# Patient Record
Sex: MALE | Race: WHITE | NOT HISPANIC OR LATINO | Employment: FULL TIME | URBAN - METROPOLITAN AREA
[De-identification: names, ages, dates, MRNs, and addresses within clinical notes are randomized per-mention and may not be internally consistent; named-entity substitution may affect disease eponyms.]

---

## 2017-12-14 ENCOUNTER — OFFICE VISIT (OUTPATIENT)
Dept: FAMILY MEDICINE | Facility: CLINIC | Age: 42
End: 2017-12-14
Payer: OTHER GOVERNMENT

## 2017-12-14 VITALS
DIASTOLIC BLOOD PRESSURE: 88 MMHG | HEART RATE: 79 BPM | TEMPERATURE: 99.2 F | WEIGHT: 244.7 LBS | BODY MASS INDEX: 33.29 KG/M2 | SYSTOLIC BLOOD PRESSURE: 119 MMHG

## 2017-12-14 DIAGNOSIS — R07.0 THROAT PAIN: ICD-10-CM

## 2017-12-14 DIAGNOSIS — J00 ACUTE NASOPHARYNGITIS: Primary | ICD-10-CM

## 2017-12-14 LAB
DEPRECATED S PYO AG THROAT QL EIA: NORMAL
SPECIMEN SOURCE: NORMAL

## 2017-12-14 PROCEDURE — 87880 STREP A ASSAY W/OPTIC: CPT | Performed by: PHYSICIAN ASSISTANT

## 2017-12-14 PROCEDURE — 99213 OFFICE O/P EST LOW 20 MIN: CPT | Performed by: PHYSICIAN ASSISTANT

## 2017-12-14 PROCEDURE — 87081 CULTURE SCREEN ONLY: CPT | Performed by: PHYSICIAN ASSISTANT

## 2017-12-14 RX ORDER — FLUTICASONE PROPIONATE 50 MCG
1-2 SPRAY, SUSPENSION (ML) NASAL DAILY
Qty: 1 BOTTLE | Refills: 0 | Status: SHIPPED | OUTPATIENT
Start: 2017-12-14 | End: 2019-09-30

## 2017-12-14 NOTE — NURSING NOTE
"Chief Complaint   Patient presents with     Patient Request     strep check     Health Maintenance     Lipid and Influenza        Initial /88 (BP Location: Left arm, Patient Position: Chair, Cuff Size: Adult Large)  Pulse 79  Temp 99.2  F (37.3  C) (Oral)  Wt 244 lb 11.2 oz (111 kg)  BMI 33.29 kg/m2 Estimated body mass index is 33.29 kg/(m^2) as calculated from the following:    Height as of 2/22/16: 5' 11.89\" (1.826 m).    Weight as of this encounter: 244 lb 11.2 oz (111 kg).  Medication Reconciliation: complete     AIDAN Engle MA      "

## 2017-12-14 NOTE — MR AVS SNAPSHOT
"              After Visit Summary   2017    Alex Gillespie    MRN: [id number]           Patient Information     Date Of Birth          1975        Visit Information        Provider Department      2017 10:20 AM Dinah Rodríguez PA-C [facility]        Today's Diagnoses     Throat pain    -  1    Acute nasopharyngitis           Follow-ups after your visit        Who to contact     If you have questions or need follow up information about today's clinic visit or your schedule please contact [facility] directly at [phone].  Normal or non-critical lab and imaging results will be communicated to you by Jobsterhart, letter or phone within 4 business days after the clinic has received the results. If you do not hear from us within 7 days, please contact the clinic through Jobsterhart or phone. If you have a critical or abnormal lab result, we will notify you by phone as soon as possible.  Submit refill requests through medidametrics or call your pharmacy and they will forward the refill request to us. Please allow 3 business days for your refill to be completed.          Additional Information About Your Visit        MyChart Information     medidametrics lets you send messages to your doctor, view your test results, renew your prescriptions, schedule appointments and more. To sign up, go to [email or web address] . Click on \"Log in\" on the left side of the screen, which will take you to the Welcome page. Then click on \"Sign up Now\" on the right side of the page.     You will be asked to enter the access code listed below, as well as some personal information. Please follow the directions to create your username and password.     Your access code is: [email or web address]  Expires: 3/14/2018 10:39 AM     Your access code will  in 90 days. If you need help or a new code, please call your [facility] or [phone].        Care EveryWhere ID     This is your Care EveryWhere " ID. This could be used by other organizations to access your [facility] medical records  [id number]        Your Vitals Were     Pulse Temperature BMI (Body Mass Index)             79 99.2  F (37.3  C) (Oral) 33.29 kg/m2          Blood Pressure from Last 3 Encounters:   12/14/17 151/82   02/22/16 134/76   01/25/16 114/75    Weight from Last 3 Encounters:   12/14/17 244 lb 11.2 oz (111 kg)   02/22/16 241 lb (109.3 kg)   01/25/16 250 lb (113.4 kg)              We Performed the Following     Strep, Rapid Screen          Today's Medication Changes          These changes are accurate as of: 12/14/17 10:39 AM.  If you have any questions, ask your nurse or doctor.               Start taking these medicines.        Dose/Directions    fluticasone 50 MCG/ACT spray   Commonly known as:  FLONASE   Used for:  Acute nasopharyngitis   Started by:  Dinah Rodríguez PA-C        Dose:  1-2 spray   Spray 1-2 sprays into both nostrils daily   Quantity:  1 Bottle   Refills:  0            Where to get your medicines      These medications were sent to [facility] in [place] [address]  [address] [facility] [address]     Phone:  [phone]     fluticasone 50 MCG/ACT spray                Primary Care Provider Office Phone # Fax #    [facility] [phone]       [address] [facility] [address]        Equal Access to Services     SALLIE BURGER AH: Mike doradoo Sosamanthaali, waaxda luqadaha, qaybta kaalmada sam, waxtoshia chase morrow. So [facility] [phone].    ATENCIÓN: Si habla español, tiene a garcia disposición servicios gratuitos de asistencia lingüística. Llame al [phone].    We comply with applicable federal civil rights laws and [place] laws. We do not discriminate on the basis of race, color, national origin, age, disability, sex, sexual orientation, or gender identity.            Thank  you!     Thank you for choosing [facility]  for your care. Our goal is always to provide you with excellent care. Hearing back from our patients is one way we can continue to improve our services. Please take a few minutes to complete the written survey that you may receive in the mail after your visit with us. Thank you!             Your Updated Medication List - Protect others around you: Learn how to safely use, store and throw away your medicines at www.disposemymeds.org.          This list is accurate as of: 12/14/17 10:39 AM.  Always use your most recent med list.                   Brand Name Dispense Instructions for use Diagnosis    cephALEXin 500 MG capsule    KEFLEX    40 capsule    Take 1 capsule (500 mg) by mouth 4 times daily    Cellulitis of left index finger, Paronychia, finger, left       fluticasone 50 MCG/ACT spray    FLONASE    1 Bottle    Spray 1-2 sprays into both nostrils daily    Acute nasopharyngitis       NO ACTIVE MEDICATIONS

## 2017-12-14 NOTE — PROGRESS NOTES
SUBJECTIVE:   Alex Gillespie is a 42 year old male who presents to clinic today for the following health issues:    Acute Illness   Acute illness concerns: Sore Throat  Onset: 2 days    Fever: YES- 100.6 F    Chills/Sweats: YES    Headache (location?): YES    Sinus Pressure:no    Conjunctivitis:  no    Ear Pain: no    Rhinorrhea: YES    Congestion: YES    Sore Throat: YES     Cough: YES-productive of green sputum    Wheeze: no     Decreased Appetite: YES    Nausea: no    Vomiting: no    Diarrhea:  no    Dysuria/Freq.: no    Fatigue/Achiness: YES    Sick/Strep Exposure: no      Therapies Tried and outcome: none    Sore throat started two days ago. Fever 100.6 last night, did not take anything for it today. Has had chills and sweats. Cough, congestion and rhinorrhea started within the past day. Congestion keeping him up at night.   No diarrhea, constipation, nausea or vomiting. Achy on the tops of his feet two nights ago.   Has not tried anything for symptoms.     Problem list and histories reviewed & adjusted, as indicated.  Additional history: as documented        Reviewed and updated as needed this visit by clinical staffTobacco  Allergies  Meds  Problems  Med Hx  Surg Hx  Fam Hx  Soc Hx        Reviewed and updated as needed this visit by Provider  Allergies  Meds  Problems         ROS:  Constitutional, HEENT, cardiovascular, pulmonary, gi and gu systems are negative, except as otherwise noted.      OBJECTIVE:   /88 (BP Location: Left arm, Patient Position: Chair, Cuff Size: Adult Large)  Pulse 79  Temp 99.2  F (37.3  C) (Oral)  Wt 244 lb 11.2 oz (111 kg)  BMI 33.29 kg/m2  Body mass index is 33.29 kg/(m^2).  GENERAL: healthy, alert and no distress  HENT: ear canals and TM's normal, nose normal. Mouth with mild petechiae in throat.  NECK: patient with pain with palpation to the the cervical lymph nodes, but no lymphadenopathy.   RESP: lungs clear to auscultation - no rales, rhonchi or  wheezes  CV: regular rate and rhythm, normal S1 S2, no S3 or S4, no murmur, click or rub    Diagnostic Test Results:  Results for orders placed or performed in visit on 12/14/17 (from the past 24 hour(s))   Strep, Rapid Screen   Result Value Ref Range    Specimen Description Throat     Rapid Strep A Screen       NEGATIVE: No Group A streptococcal antigen detected by immunoassay, await culture report.     ASSESSMENT/PLAN:       ICD-10-CM    1. Acute nasopharyngitis J00 fluticasone (FLONASE) 50 MCG/ACT spray   2. Throat pain R07.0 Strep, Rapid Screen     Beta strep group A culture   Viral illness treated with symptomatic management. Use Flonase daily to help dry up congestion and mucus. Encouraged rest and hydration. Tylenol or ibuprofen for low grade fever. BP recheck normal.      Follow Up: If symptoms worsen or do not improve in the next 7-10 days, call or return to clinic.     ANITRA Lizama PA-C  [facility]  The student Sherrie Griffith PAS2 acted as a scribe and the encounter documented above was completely performed by myself and the documentation reflects the work I have performed today.   Dinah Rodríguez PA-C         The above chart was reviewed.  The patient was not examined by me.  Eben Coy MD (supervising physician)  Family Medicine  [facility]

## 2017-12-14 NOTE — NURSING NOTE
"Chief Complaint   Patient presents with     Patient Request     strep check     Health Maintenance     Lipid and Influenza        Initial /82 (BP Location: Right arm, Patient Position: Chair, Cuff Size: Adult Large)  Pulse 79  Temp 99.2  F (37.3  C) (Oral)  Wt 244 lb 11.2 oz (111 kg)  BMI 33.29 kg/m2 Estimated body mass index is 33.29 kg/(m^2) as calculated from the following:    Height as of 2/22/16: 5' 11.89\" (1.826 m).    Weight as of this encounter: 244 lb 11.2 oz (111 kg).  Medication Reconciliation: complete   AIDAN Engle MA      "

## 2017-12-15 LAB
BACTERIA SPEC CULT: NORMAL
SPECIMEN SOURCE: NORMAL

## 2018-03-16 ENCOUNTER — TELEPHONE (OUTPATIENT)
Dept: FAMILY MEDICINE | Facility: CLINIC | Age: 43
End: 2018-03-16

## 2018-03-16 DIAGNOSIS — Z13.6 CARDIOVASCULAR SCREENING; LDL GOAL LESS THAN 160: Primary | ICD-10-CM

## 2018-03-16 NOTE — TELEPHONE ENCOUNTER
Reason for Call:  Other     Detailed comments: patient is needing to get a lipid panel done for the [employer]/ [place] and needs it before then.  Please call patient.    Phone Number Patient can be reached at: Cell number on file:    Telephone Information:   Mobile [phone]       Best Time: any    Can we leave a detailed message on this number? YES    Call taken on 3/16/2018 at 12:19 PM by Tg Cotton

## 2018-03-19 DIAGNOSIS — Z13.6 CARDIOVASCULAR SCREENING; LDL GOAL LESS THAN 160: ICD-10-CM

## 2018-03-19 LAB
CHOLEST SERPL-MCNC: 146 MG/DL
HDLC SERPL-MCNC: 40 MG/DL
LDLC SERPL CALC-MCNC: 69 MG/DL
NONHDLC SERPL-MCNC: 106 MG/DL
TRIGL SERPL-MCNC: 187 MG/DL

## 2018-03-19 PROCEDURE — 36415 COLL VENOUS BLD VENIPUNCTURE: CPT | Performed by: PHYSICIAN ASSISTANT

## 2018-03-19 PROCEDURE — 80061 LIPID PANEL: CPT | Performed by: PHYSICIAN ASSISTANT

## 2018-03-19 NOTE — LETTER
[facility]  [address] [place]  [address]  [phone]        March 20, 2018    Alex Gillespie  [address] [facility] [address]        Dear Alex,    The results of your recent labs were within normal limits.    Results for orders placed or performed in visit on 03/19/18   Lipid panel reflex to direct LDL Fasting   Result Value Ref Range    Cholesterol 146 <200 mg/dL    Triglycerides 187 (H) <150 mg/dL    HDL Cholesterol 40 >39 mg/dL    LDL Cholesterol Calculated 69 <100 mg/dL    Non HDL Cholesterol 106 <130 mg/dL       If you have any questions please call the clinic at [phone].    Sincerely,    Dinah OBRIEN

## 2018-03-19 NOTE — TELEPHONE ENCOUNTER
I have seen patient one time for an acute issue. 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 I would recommend he wait until his return.   Dinah Rodríguez PA-C

## 2018-03-19 NOTE — TELEPHONE ENCOUNTER
Attempt # 1  Called patient at home number.  Was call answered?  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      Cecile Curry RN  [facility]

## 2018-03-19 NOTE — TELEPHONE ENCOUNTER
Attempt # 1  Called patient at home number.  Was call answered? Yes, relayed below information, patient verbalized understanding and said we will do it and see what happens. Already has lab only appointment scheduled for 9:45 am today.     Cecile Curry RN  [facility]

## 2018-03-19 NOTE — TELEPHONE ENCOUNTER
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   Dinah Rodríguez PA-C

## 2018-03-20 NOTE — PROGRESS NOTES
Your triglycerides are elevated. The triglycerides are high. Lowering the amount of sugar, alcohol and sweets in the diet helps to control this.Exercise and weight loss helps.  They are not high enough to consider therapy with medicine.  Dinah Rodríguez PA-C

## 2019-09-30 ENCOUNTER — OFFICE VISIT (OUTPATIENT)
Dept: FAMILY MEDICINE | Facility: CLINIC | Age: 44
End: 2019-09-30
Payer: OTHER GOVERNMENT

## 2019-09-30 VITALS
WEIGHT: 237.8 LBS | HEART RATE: 83 BPM | TEMPERATURE: 97.9 F | SYSTOLIC BLOOD PRESSURE: 119 MMHG | HEIGHT: 72 IN | DIASTOLIC BLOOD PRESSURE: 84 MMHG | BODY MASS INDEX: 32.21 KG/M2

## 2019-09-30 DIAGNOSIS — M54.50 ACUTE LEFT-SIDED LOW BACK PAIN WITHOUT SCIATICA: Primary | ICD-10-CM

## 2019-09-30 PROCEDURE — 99203 OFFICE O/P NEW LOW 30 MIN: CPT | Performed by: FAMILY MEDICINE

## 2019-09-30 ASSESSMENT — MIFFLIN-ST. JEOR: SCORE: 2011.65

## 2019-09-30 NOTE — PATIENT INSTRUCTIONS
For pain relief take 1 ibuprofen (200 mg) and 2 extra strength tylenol at the same time every 6 hours prn

## 2019-09-30 NOTE — PROGRESS NOTES
Subjective     Alex Gillespie is a 44 year old male who presents to clinic today for the following health issues:    HPI     Back Pain       Duration: 9/10/19 x 5 days /  9/22/19-9/26/19        Specific cause: NKI    Description:   Location of pain: low back bilateral  Character of pain: sharp and stabbing  Pain radiation:  Up his back  New numbness or weakness in legs, not attributed to pain:  no     Intensity: Currently 3/10, At its worst 9/10    History:   Pain interferes with job: YES  History of back problems: April 2018 and Nov 2018   Any previous MRI or X-rays: None  Sees a specialist for back pain:  Yes Over in [facility]  Therapies tried without relief: Ice    Alleviating factors:   Improved by: acetaminophen (Tylenol)  And IBU helps a little.  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   Patient has been more prevalent in the last year   He had two incidents when in [facility] and [place]   In [facility] he saw a back doctor   He tried manipulation, TENS and dry needling   Treated with ice and rest     He was doing strengthening exercises   He does not go into the legs     O: /84 (BP Location: Right arm, Patient Position: Sitting, Cuff Size: Adult Large)   Pulse 83   Temp 97.9  F (36.6  C) (Oral)   Ht 1.829 m (6')   Wt 107.9 kg (237 lb 12.8 oz)   BMI 32.25 kg/m              Accompanying Signs & Symptoms:  Risk of Fracture:  None  Risk of Cauda Equina:  None  Risk of Infection:  None  Risk of Cancer:  None  Risk of Ankylosing Spondylitis:  Onset at age <35, male, AND morning back stiffness. no       This is a recurrent problem   2 times this month   Patient is in the  and he does push ups and abdominal exercises with planks and sit ups     Patient has no pain over spine   He has left paraspinal muscle pain     Negative straight leg test   Patient has pain in back with external rotation of hips bilaterally    Normal movement of the feet     DTR's are wnl in lower extremities       ICD-10-CM    1. Acute left-sided low back pain without sciatica M54.5      Ice pack   Manipulation   If needed muscle relaxer

## 2020-08-27 ENCOUNTER — VIRTUAL VISIT (OUTPATIENT)
Dept: FAMILY MEDICINE | Facility: CLINIC | Age: 45
End: 2020-08-27
Payer: OTHER GOVERNMENT

## 2020-08-27 VITALS — BODY MASS INDEX: 33.23 KG/M2 | WEIGHT: 245 LBS

## 2020-08-27 DIAGNOSIS — G89.29 CHRONIC RIGHT-SIDED LOW BACK PAIN WITH RIGHT-SIDED SCIATICA: ICD-10-CM

## 2020-08-27 DIAGNOSIS — M54.40 ACUTE MIDLINE LOW BACK PAIN WITH SCIATICA, SCIATICA LATERALITY UNSPECIFIED: Primary | ICD-10-CM

## 2020-08-27 DIAGNOSIS — M54.41 CHRONIC RIGHT-SIDED LOW BACK PAIN WITH RIGHT-SIDED SCIATICA: ICD-10-CM

## 2020-08-27 PROCEDURE — 99213 OFFICE O/P EST LOW 20 MIN: CPT | Mod: 95 | Performed by: OBSTETRICS & GYNECOLOGY

## 2020-08-27 NOTE — PROGRESS NOTES
"Alex Gillespie is a 45 year old male who is being evaluated via a billable video visit.      The patient has been notified of following:     \"This video visit will be conducted via a call between you and your physician/provider. We have found that certain health care needs can be provided without the need for an in-person physical exam.  This service lets us provide the care you need with a video conversation.  If a prescription is necessary we can send it directly to your pharmacy.  If lab work is needed we can place an order for that and you can then stop by our lab to have the test done at a later time.    Video visits are billed at different rates depending on your insurance coverage.  Please reach out to your insurance provider with any questions.    If during the course of the call the physician/provider feels a video visit is not appropriate, you will not be charged for this service.\"    Patient has given verbal consent for Video visit? Yes  How would you like to obtain your AVS? MyChart  If you are dropped from the video visit, the video invite should be resent to: mychart  Will anyone else be joining your video visit? No      Subjective     Alex Gillespie is a 45 year old male who presents today via video visit for the following health issues:    HPI  Subjective: Alex requested a videoconference consultation today because of concerns regarding  Back and knee issues. Due to the current covid-19 coronavirus epidemic we are managing much of our patients' concerns remotely when possible.    He presented to a preemployment doc for an exam today to be cleared as a  and the doctor advised him to get imaging on his knee and back because he ahs had a history of back and knee pain intermittently.  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 He was in the  and had some \"wear and tear\"- hyperextended his knee in high school.    The past medical history and medications and " allergies have been reviewed today by me.  .History reviewed. No pertinent past medical history.  No Known Allergies  Current Outpatient Medications   Medication Sig Dispense Refill     Acetaminophen (TYLENOL PO) Take by mouth as needed for mild pain or fever       Ibuprofen (IBU PO) Take by mouth as needed         Objective:  He looks well.    Assessment/Plan:   history of intermittent back spasms and knee pain- currently no sx. 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      Start of call: 1223  hours    Call ended: 1231   hours  Videoconference call duration was   8  minutes.     GABRIELLE Andrade MD

## 2020-09-01 NOTE — PROGRESS NOTES
Sports Medicine Clinic Visit    PCP: Joe Finnegan    CC: Patient presents with:  Lower Back - Pain      HPI:  Alex Gillespie is a 45 year old male who is seen in consultation at the request of Dr. Andrade. He is applying to be a . He notes he has bouts of pain in the back that happen about every other month.  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 He hasn't had an issue in 4 months. He is unsure what triggers it. It could happen standing up, overnight. He normally treats with chiropractic care and that helps.  He notes at baseline he has some level of pain (3/10). He describes it as general soreness.  With flare ups he gets sharp pains switching from one side of the back to the other.  He rates the pain at a  9/10 at its worst and a 3/10 currently.  Symptoms are relieved with ice, ibuprofen. Symptoms are worsened by extending and sitting down with flares. He endorses pain with flare ups.   He denies numbness, tingling and weakness.  Other treatment has included chiropractic care in the past.       He also notes a left knee injury many years ago when he hyperextended the knee. He notes pain in the knee if he overly active, running over 4 miles. He normally doesn't have pain unless he overuses the knee. He denies instability, locking, and catching.     Review of Systems:  Musculoskeletal: as above  Remainder of review of systems is negative including constitutional, eyes, ENT, CV, pulmonary, GI, , endocrine, skin, hematologic, and neurologic except as noted in HPI or medical history.    History reviewed. No pertinent past surgical/medical/family/social history other than as mentioned in HPI.    Patient Active Problem List   Diagnosis     CARDIOVASCULAR SCREENING; LDL GOAL LESS THAN 160     No past medical history on file.  Past Surgical History:   Procedure Laterality Date     HC TOOTH EXTRACTION W/FORCEP      WISDOM TEETH     Family History    Problem Relation Age of Onset     Cardiovascular Mother      Eye Disorder Father         catarct     Thyroid Disease Sister      Social History     Socioeconomic History     Marital status:      Spouse name: Not on file     Number of children: Not on file     Years of education: Not on file     Highest education level: Not on file   Occupational History     Not on file   Social Needs     Financial resource strain: Not on file     Food insecurity     Worry: Not on file     Inability: Not on file     Transportation needs     Medical: Not on file     Non-medical: Not on file   Tobacco Use     Smoking status: Never Smoker     Smokeless tobacco: Never Used   Substance and Sexual Activity     Alcohol use: Yes     Comment: occasional      Drug use: No     Sexual activity: Yes     Partners: Female     Comment: wife   Lifestyle     Physical activity     Days per week: Not on file     Minutes per session: Not on file     Stress: Not on file   Relationships     Social connections     Talks on phone: Not on file     Gets together: Not on file     Attends Baptism service: Not on file     Active member of club or organization: Not on file     Attends meetings of clubs or organizations: Not on file     Relationship status: Not on file     Intimate partner violence     Fear of current or ex partner: Not on file     Emotionally abused: Not on file     Physically abused: Not on file     Forced sexual activity: Not on file   Other Topics Concern     Parent/sibling w/ CABG, MI or angioplasty before 65F 55M? 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     Current Outpatient Medications   Medication     Acetaminophen (TYLENOL PO)     Ibuprofen (IBU PO)     No current facility-administered medications for this visit.      No Known Allergies      Objective:  /84   Ht 1.829 m (6')   Wt 115.2 kg (254 lb)   BMI 34.45 kg/m      General: Alert and  in no distress    Head: Normocephalic, atraumatic  Eyes: no scleral icterus or conjunctival erythema   Skin: no erythema, petechiae, or jaundice  CV: regular rhythm by palpation, 2+ distal pulses  Resp: normal respiratory effort without conversational dyspnea   Psych: normal mood and affect    Gait: Non-antalgic, appropriate coordination and balance   Neuro: Motor strength and sensation as noted below    Musculoskeletal:    Low back and knee exam:    Inspection:     no visible deformity in the low back       normal skin       normal vascular       normal lymphatic       no asymmetry    Palpation:  No tenderness to palpation over the lumbar spine/paraspinal muscles or knees    ROM: Full lumbar flexion, extension, rotation, and lateral flexion without pain.  Seated bilateral knee extension and flexion without pain.      Strength:  Hip flexion 5/5 bilaterally  Hip abduction 5/5 bilaterally  Hip adduction 5/5 bilaterally  Knee flexion 5/5 bilaterally  Knee extension 5/5 bilaterally  Ankle dorsiflexion 5/5 bilaterally  Ankle plantarflexion 5/5 bilaterally    Sensation:    grossly intact throughout lower extremities    Special Tests:  Squatting does not pain      Radiology:  X-rays ordered and independent visualization of images performed and reviewed Patrice.    Recent Results (from the past 744 hour(s))   XR Lumbar Spine 2/3 Views    Narrative    LUMBAR SPINE TWO TO THREE VIEWS   9/3/2020 1:34 PM     HISTORY: Chronic right-sided low back pain with right-sided sciatica.    COMPARISON: None.      Impression    IMPRESSION : Mild-to-moderate multilevel degenerative disc and facet  disease is present. There is minimal retrolisthesis at L2-L3.  Posterior alignment is otherwise normal. Vertebral body heights are  relatively maintained in the lumbar region as visualized. Incidental  note is made of partial sacralization at L5.    ANGEL MARRUFO MD   XR Knee Standing AP Bilat [place] Bilat Lat Left    Narrative    KNEE STANDING AP  BILATERAL [place] BILATERAL LATERAL LEFT 9/3/2020  1:35 PM     HISTORY: Chronic pain of left knee.    COMPARISON: Left knee 11/5/2013      Impression    IMPRESSION: Again there are two small adjacent linear metallic foreign  bodies at the anterior aspect of the proximal left tibia. Otherwise  unremarkable.       Assessment:  1. Chronic pain of left knee    2. Chronic right-sided low back pain with right-sided sciatica        Plan:  Discussed the assessment with the patient and developed a plan together:  -Regarding Patrice's back and left knee, I am comfortable clearing him for work as a .  -Ice or heat for 15-20 minutes as needed for soreness.  -Patient's preferred over the counter medication as directed on packaging as needed for pain or soreness.  -Chiropractic care as desired.      -Follow up as needed if symptoms fail to improve or worsen.  Please call with questions or concerns.            Audrey Colindres MD, CAQ Sports Medicine  [facility]

## 2020-09-03 ENCOUNTER — ANCILLARY PROCEDURE (OUTPATIENT)
Dept: GENERAL RADIOLOGY | Facility: CLINIC | Age: 45
End: 2020-09-03
Attending: PHYSICAL MEDICINE & REHABILITATION
Payer: OTHER GOVERNMENT

## 2020-09-03 ENCOUNTER — OFFICE VISIT (OUTPATIENT)
Dept: ORTHOPEDICS | Facility: CLINIC | Age: 45
End: 2020-09-03
Payer: OTHER GOVERNMENT

## 2020-09-03 VITALS
SYSTOLIC BLOOD PRESSURE: 132 MMHG | WEIGHT: 254 LBS | DIASTOLIC BLOOD PRESSURE: 84 MMHG | BODY MASS INDEX: 34.4 KG/M2 | HEIGHT: 72 IN

## 2020-09-03 DIAGNOSIS — G89.29 CHRONIC PAIN OF LEFT KNEE: Primary | ICD-10-CM

## 2020-09-03 DIAGNOSIS — M54.41 CHRONIC RIGHT-SIDED LOW BACK PAIN WITH RIGHT-SIDED SCIATICA: ICD-10-CM

## 2020-09-03 DIAGNOSIS — G89.29 CHRONIC RIGHT-SIDED LOW BACK PAIN WITH RIGHT-SIDED SCIATICA: ICD-10-CM

## 2020-09-03 DIAGNOSIS — G89.29 CHRONIC PAIN OF LEFT KNEE: ICD-10-CM

## 2020-09-03 DIAGNOSIS — M25.562 CHRONIC PAIN OF LEFT KNEE: Primary | ICD-10-CM

## 2020-09-03 DIAGNOSIS — M25.562 CHRONIC PAIN OF LEFT KNEE: ICD-10-CM

## 2020-09-03 PROCEDURE — 73562 X-RAY EXAM OF KNEE 3: CPT

## 2020-09-03 PROCEDURE — 99244 OFF/OP CNSLTJ NEW/EST MOD 40: CPT | Performed by: PHYSICAL MEDICINE & REHABILITATION

## 2020-09-03 PROCEDURE — 72100 X-RAY EXAM L-S SPINE 2/3 VWS: CPT

## 2020-09-03 ASSESSMENT — MIFFLIN-ST. JEOR: SCORE: 2080.14

## 2020-09-03 NOTE — LETTER
9/3/2020         RE: Alex Gillespie  [address] [facility] [id number]        Dear Colleague,    Thank you for referring your patient, Alex Gillespie, to the [facility]. Please see a copy of my visit note below.    Sports Medicine Clinic Visit    PCP: Joe Finnegan    CC: Patient presents with:  Lower Back - Pain      HPI:  Alex Gillespie is a 45 year old male who is seen in consultation at the request of Dr. Andrade. He is applying to be a . He notes he has bouts of pain in the back that happen about every other month.  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 He hasn't had an issue in 4 months. He is unsure what triggers it. It could happen standing up, overnight. He normally treats with chiropractic care and that helps.  He notes at baseline he has some level of pain (3/10). He describes it as general soreness.  With flare ups he gets sharp pains switching from one side of the back to the other.  He rates the pain at a  9/10 at its worst and a 3/10 currently.  Symptoms are relieved with ice, ibuprofen. Symptoms are worsened by extending and sitting down with flares. He endorses pain with flare ups.   He denies numbness, tingling and weakness.  Other treatment has included chiropractic care in the past.       He also notes a left knee injury many years ago when he hyperextended the knee. He notes pain in the knee if he overly active, running over 4 miles. He normally doesn't have pain unless he overuses the knee. He denies instability, locking, and catching.     Review of Systems:  Musculoskeletal: as above  Remainder of review of systems is negative including constitutional, eyes, ENT, CV, pulmonary, GI, , endocrine, skin, hematologic, and neurologic except as noted in HPI or medical history.    History reviewed. No pertinent past surgical/medical/family/social history other than as  mentioned in HPI.    Patient Active Problem List   Diagnosis     CARDIOVASCULAR SCREENING; LDL GOAL LESS THAN 160     No past medical history on file.  Past Surgical History:   Procedure Laterality Date     HC TOOTH EXTRACTION W/FORCEP      WISDOM TEETH     Family History   Problem Relation Age of Onset     Cardiovascular Mother      Eye Disorder Father         catarct     Thyroid Disease Sister      Social History     Socioeconomic History     Marital status:      Spouse name: Not on file     Number of children: Not on file     Years of education: Not on file     Highest education level: Not on file   Occupational History     Not on file   Social Needs     Financial resource strain: Not on file     Food insecurity     Worry: Not on file     Inability: Not on file     Transportation needs     Medical: Not on file     Non-medical: Not on file   Tobacco Use     Smoking status: Never Smoker     Smokeless tobacco: Never Used   Substance and Sexual Activity     Alcohol use: Yes     Comment: occasional      Drug use: No     Sexual activity: Yes     Partners: Female     Comment: wife   Lifestyle     Physical activity     Days per week: Not on file     Minutes per session: Not on file     Stress: Not on file   Relationships     Social connections     Talks on phone: Not on file     Gets together: Not on file     Attends Orthodox service: Not on file     Active member of club or organization: Not on file     Attends meetings of clubs or organizations: Not on file     Relationship status: Not on file     Intimate partner violence     Fear of current or ex partner: Not on file     Emotionally abused: Not on file     Physically abused: Not on file     Forced sexual activity: Not on file   Other Topics Concern     Parent/sibling w/ CABG, MI or angioplasty before 65F 55M? 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     Current Outpatient  Medications   Medication     Acetaminophen (TYLENOL PO)     Ibuprofen (IBU PO)     No current facility-administered medications for this visit.      No Known Allergies      Objective:  /84   Ht 1.829 m (6')   Wt 115.2 kg (254 lb)   BMI 34.45 kg/m      General: Alert and in no distress    Head: Normocephalic, atraumatic  Eyes: no scleral icterus or conjunctival erythema   Skin: no erythema, petechiae, or jaundice  CV: regular rhythm by palpation, 2+ distal pulses  Resp: normal respiratory effort without conversational dyspnea   Psych: normal mood and affect    Gait: Non-antalgic, appropriate coordination and balance   Neuro: Motor strength and sensation as noted below    Musculoskeletal:    Low back and knee exam:    Inspection:     no visible deformity in the low back       normal skin       normal vascular       normal lymphatic       no asymmetry    Palpation:  No tenderness to palpation over the lumbar spine/paraspinal muscles or knees    ROM: Full lumbar flexion, extension, rotation, and lateral flexion without pain.  Seated bilateral knee extension and flexion without pain.      Strength:  Hip flexion 5/5 bilaterally  Hip abduction 5/5 bilaterally  Hip adduction 5/5 bilaterally  Knee flexion 5/5 bilaterally  Knee extension 5/5 bilaterally  Ankle dorsiflexion 5/5 bilaterally  Ankle plantarflexion 5/5 bilaterally    Sensation:    grossly intact throughout lower extremities    Special Tests:  Squatting does not pain      Radiology:  X-rays ordered and independent visualization of images performed and reviewed Patrice.    Recent Results (from the past 744 hour(s))   XR Lumbar Spine 2/3 Views    Narrative    LUMBAR SPINE TWO TO THREE VIEWS   9/3/2020 1:34 PM     HISTORY: Chronic right-sided low back pain with right-sided sciatica.    COMPARISON: None.      Impression    IMPRESSION : Mild-to-moderate multilevel degenerative disc and facet  disease is present. There is minimal retrolisthesis at L2-L3.  Posterior  alignment is otherwise normal. Vertebral body heights are  relatively maintained in the lumbar region as visualized. Incidental  note is made of partial sacralization at L5.    ANGEL MARRUFO MD   XR Knee Standing AP Bilat [place] Bilat Lat Left    Narrative    KNEE STANDING AP BILATERAL [place] BILATERAL LATERAL LEFT 9/3/2020  1:35 PM     HISTORY: Chronic pain of left knee.    COMPARISON: Left knee 11/5/2013      Impression    IMPRESSION: Again there are two small adjacent linear metallic foreign  bodies at the anterior aspect of the proximal left tibia. Otherwise  unremarkable.       Assessment:  1. Chronic pain of left knee    2. Chronic right-sided low back pain with right-sided sciatica        Plan:  Discussed the assessment with the patient and developed a plan together:  -Regarding Patrice's back and left knee, I am comfortable clearing him for work as a .  -Ice or heat for 15-20 minutes as needed for soreness.  -Patient's preferred over the counter medication as directed on packaging as needed for pain or soreness.  -Chiropractic care as desired.      -Follow up as needed if symptoms fail to improve or worsen.  Please call with questions or concerns.            Audrey Colindres MD, [facility]  [facility]    Again, thank you for allowing me to participate in the care of your patient.        Sincerely,        Candi Colindres MD

## 2020-11-16 ENCOUNTER — HEALTH MAINTENANCE LETTER (OUTPATIENT)
Age: 45
End: 2020-11-16

## 2020-12-01 ENCOUNTER — APPOINTMENT (OUTPATIENT)
Dept: GENERAL RADIOLOGY | Facility: CLINIC | Age: 45
End: 2020-12-01
Attending: FAMILY MEDICINE
Payer: OTHER MISCELLANEOUS

## 2020-12-01 ENCOUNTER — HOSPITAL ENCOUNTER (EMERGENCY)
Facility: CLINIC | Age: 45
Discharge: HOME | End: 2020-12-01
Attending: FAMILY MEDICINE | Admitting: FAMILY MEDICINE
Payer: OTHER MISCELLANEOUS

## 2020-12-01 VITALS
DIASTOLIC BLOOD PRESSURE: 97 MMHG | OXYGEN SATURATION: 98 % | SYSTOLIC BLOOD PRESSURE: 165 MMHG | HEIGHT: 73 IN | WEIGHT: 240 LBS | TEMPERATURE: 96.9 F | HEART RATE: 89 BPM | RESPIRATION RATE: 16 BRPM | BODY MASS INDEX: 31.81 KG/M2

## 2020-12-01 DIAGNOSIS — S61.201A: ICD-10-CM

## 2020-12-01 DIAGNOSIS — S63.271A: ICD-10-CM

## 2020-12-01 DIAGNOSIS — S61.211A LACERATION OF LEFT INDEX FINGER WITHOUT FOREIGN BODY WITHOUT DAMAGE TO NAIL, INITIAL ENCOUNTER: ICD-10-CM

## 2020-12-01 DIAGNOSIS — S56.419A RUPTURE OF EXTENSOR TENDON OF FINGER: ICD-10-CM

## 2020-12-01 PROCEDURE — 29130 APPL FINGER SPLINT STATIC: CPT | Mod: F1 | Performed by: FAMILY MEDICINE

## 2020-12-01 PROCEDURE — 99283 EMERGENCY DEPT VISIT LOW MDM: CPT | Mod: 25 | Performed by: FAMILY MEDICINE

## 2020-12-01 PROCEDURE — 99284 EMERGENCY DEPT VISIT MOD MDM: CPT | Mod: 25 | Performed by: FAMILY MEDICINE

## 2020-12-01 PROCEDURE — 29130 APPL FINGER SPLINT STATIC: CPT | Mod: 54 | Performed by: FAMILY MEDICINE

## 2020-12-01 PROCEDURE — 73140 X-RAY EXAM OF FINGER(S): CPT | Mod: LT

## 2020-12-01 PROCEDURE — 250N000013 HC RX MED GY IP 250 OP 250 PS 637: Performed by: FAMILY MEDICINE

## 2020-12-01 RX ORDER — CEPHALEXIN 500 MG/1
1000 CAPSULE ORAL ONCE
Status: COMPLETED | OUTPATIENT
Start: 2020-12-01 | End: 2020-12-01

## 2020-12-01 RX ADMIN — CEPHALEXIN 1000 MG: 500 CAPSULE ORAL at 14:11

## 2020-12-01 ASSESSMENT — MIFFLIN-ST. JEOR: SCORE: 2032.51

## 2020-12-01 NOTE — ED NOTES
Was working on a wiper unit and caught his index finger on left hand at work today. States Tdap is up to date. He denies fever/chills, n/v. He is a/o x 4

## 2020-12-01 NOTE — ED PROVIDER NOTES
"  History     Chief Complaint   Patient presents with     Hand Injury     got finger caught, just prior to arrival, states he can see bone; finger is bent at 90 angle, states he straighten it     HPI    Alex Gillespie is a 45 year old male who presents after injury to the left index finger his nondominant hand that occurred at work when a windshield wiper mechanism he was testing inched his finger and pulled it into longitudinal traction.  It split open the top of the finger and he now lacks any extension at the PIP joint.  He does have sensation in the fingertip.  Stay no other injuries.  He has had no recent symptoms of illness including no cough, fever, sore throat, headache.  He has had no known Covid exposures.  His last tetanus was in 2012.    Allergies:  No Known Allergies    Problem List:    Patient Active Problem List    Diagnosis Date Noted     CARDIOVASCULAR SCREENING; LDL GOAL LESS THAN 160 10/31/2010     Priority: Medium        Past Medical History:    No past medical history on file.    Past Surgical History:    Past Surgical History:   Procedure Laterality Date     HC TOOTH EXTRACTION W/FORCEP      WISDOM TEETH       Family History:    Family History   Problem Relation Age of Onset     Cardiovascular Mother      Eye Disorder Father         catarct     Thyroid Disease Sister        Social History:  Marital Status:   [2]  Social History     Tobacco Use     Smoking status: Never Smoker     Smokeless tobacco: Never Used   Substance Use Topics     Alcohol use: Yes     Comment: occasional      Drug use: No        Medications:         Acetaminophen (TYLENOL PO)       Ibuprofen (IBU PO)        Review of Systems    All other systems are reviewed and are negative    Physical Exam   BP: (!) 165/97  Pulse: 89  Temp: 96.9  F (36.1  C)  Resp: 16  Height: 185.4 cm (6' 1\")  Weight: 108.9 kg (240 lb)  SpO2: 98 %      Physical Exam    Nursing note and vitals were reviewed.  Constitutional: Awake and alert, " adequately nourished and developed appearing 45-year-old in no apparent discomfort, who does not appear acutely ill, and who answers questions appropriately and cooperates with examination.  HEENT: EOMI.   Neck: Freely mobile.  Pulmonary/Chest: Breathing is unlabored.   Musculoskeletal: Examination of the left index finger reveals an open dislocation of the left index finger.  See the photos below.  He is unable to extend the finger at all.  The fingertip is warm and well-perfused.  There is a longitudinal laceration extending from the PIP joint to the DIP joint.  This is clean and noncontaminated  Neurological: Alert, oriented, thought content logical, coherent   Skin: Warm, dry, no rashes.  Psychiatric: Affect broad and appropriate.      ED Course        Procedures               Critical Care time:  none               Results for orders placed or performed during the hospital encounter of 12/01/20 (from the past 24 hour(s))   Fingers XR, 2-3 views, left    Narrative    LEFT FINGER TWO OR MORE VIEWS  12/1/2020 1:36 PM     HISTORY:  Laceration, bone exposed, unable to bed finger.      Impression    IMPRESSION: Deep soft tissue injury of the index finger, which extends  to the middle phalanx and PIP joint. There is a 0.4 cm density at the  medial aspect of the PIP joint, which appears to represent a small  displaced fracture fragment.       Medications   cephALEXin (KEFLEX) capsule 1,000 mg (has no administration in time range)             Discussed with orthopedics at the [facility], Dr. Arthur.  They do not have a hand surgeon on-call  Discussed with Dr. Fong, [facility] orthopedics, recommended transfer to [facility].  Discussed with Dr. Iverson, In the ED at [facility] who agreed to accept the patient in transfer.  He will go by private car.  He has someone who can drive him.    Assessments & Plan (with Medical Decision Making)     45-year-old male presents with injury to the left index finger, his nondominant  hand, having sustained longitudinal traction and mechanism at work.  Laceration has exposed to the PIP joint and ruptured the extensor tendons.  The fingertip is warm and well-perfused.  He will be transferred to [facility] for hand surgery consultation and repair.  No attempt at repair was made in the ED here as he will need hand surgery consultation.  Was not contaminated and so no irrigation was done.  It was splinted in extension and covered with a moist saline dressing.  He received cephalexin 1 g by mouth prior to transfer.  Tetanus is up-to-date.    I have reviewed the nursing notes.    I have reviewed the findings, diagnosis, plan and need for follow up with the patient.       New Prescriptions    No medications on file       Final diagnoses:   Laceration of left index finger without foreign body without damage to nail, initial encounter   Rupture of extensor tendon of finger   Open dislocation of interphalangeal joint of left index finger       12/1/2020   [facility] EMERGENCY DEPT     Osiel Monaco MD  12/01/20 1525

## 2020-12-01 NOTE — DISCHARGE INSTRUCTIONS
Proceed to the emergency department at [facility].    Do not have anything to eat or drink prior to arrival there.

## 2020-12-01 NOTE — ED TRIAGE NOTES
got finger caught, just prior to arrival, states he can see bone; finger is bent at 90 angle, states he straighten it

## 2021-01-04 ENCOUNTER — HOSPITAL ENCOUNTER (EMERGENCY)
Facility: CLINIC | Age: 46
Discharge: HOME | End: 2021-01-04
Attending: EMERGENCY MEDICINE | Admitting: EMERGENCY MEDICINE
Payer: OTHER GOVERNMENT

## 2021-01-04 ENCOUNTER — APPOINTMENT (OUTPATIENT)
Dept: GENERAL RADIOLOGY | Facility: CLINIC | Age: 46
End: 2021-01-04
Attending: EMERGENCY MEDICINE
Payer: OTHER GOVERNMENT

## 2021-01-04 VITALS
HEIGHT: 73 IN | RESPIRATION RATE: 16 BRPM | TEMPERATURE: 98 F | OXYGEN SATURATION: 97 % | DIASTOLIC BLOOD PRESSURE: 76 MMHG | SYSTOLIC BLOOD PRESSURE: 109 MMHG | HEART RATE: 78 BPM | BODY MASS INDEX: 31.66 KG/M2

## 2021-01-04 DIAGNOSIS — R07.9 CHEST PAIN, UNSPECIFIED TYPE: ICD-10-CM

## 2021-01-04 LAB
ALBUMIN SERPL-MCNC: 3.8 G/DL (ref 3.4–5)
ALP SERPL-CCNC: 105 U/L (ref 40–150)
ALT SERPL W P-5'-P-CCNC: 50 U/L (ref 0–70)
ANION GAP SERPL CALCULATED.3IONS-SCNC: 5 MMOL/L (ref 3–14)
AST SERPL W P-5'-P-CCNC: 30 U/L (ref 0–45)
BASOPHILS # BLD AUTO: 0.1 10E9/L (ref 0–0.2)
BASOPHILS NFR BLD AUTO: 0.7 %
BILIRUB SERPL-MCNC: 0.8 MG/DL (ref 0.2–1.3)
BUN SERPL-MCNC: 18 MG/DL (ref 7–30)
CALCIUM SERPL-MCNC: 9 MG/DL (ref 8.5–10.1)
CHLORIDE SERPL-SCNC: 109 MMOL/L (ref 94–109)
CO2 SERPL-SCNC: 28 MMOL/L (ref 20–32)
CREAT SERPL-MCNC: 0.98 MG/DL (ref 0.66–1.25)
DIFFERENTIAL METHOD BLD: NORMAL
EOSINOPHIL # BLD AUTO: 0.2 10E9/L (ref 0–0.7)
EOSINOPHIL NFR BLD AUTO: 1.9 %
ERYTHROCYTE [DISTWIDTH] IN BLOOD BY AUTOMATED COUNT: 12.6 % (ref 10–15)
GFR SERPL CREATININE-BSD FRML MDRD: >90 ML/MIN/{1.73_M2}
GLUCOSE SERPL-MCNC: 107 MG/DL (ref 70–99)
HCT VFR BLD AUTO: 44.3 % (ref 40–53)
HGB BLD-MCNC: 14.6 G/DL (ref 13.3–17.7)
IMM GRANULOCYTES # BLD: 0 10E9/L (ref 0–0.4)
IMM GRANULOCYTES NFR BLD: 0.4 %
INTERPRETATION ECG - MUSE: NORMAL
LIPASE SERPL-CCNC: 106 U/L (ref 73–393)
LYMPHOCYTES # BLD AUTO: 1.6 10E9/L (ref 0.8–5.3)
LYMPHOCYTES NFR BLD AUTO: 18.3 %
MCH RBC QN AUTO: 29.1 PG (ref 26.5–33)
MCHC RBC AUTO-ENTMCNC: 33 G/DL (ref 31.5–36.5)
MCV RBC AUTO: 88 FL (ref 78–100)
MONOCYTES # BLD AUTO: 0.6 10E9/L (ref 0–1.3)
MONOCYTES NFR BLD AUTO: 7.1 %
NEUTROPHILS # BLD AUTO: 6.1 10E9/L (ref 1.6–8.3)
NEUTROPHILS NFR BLD AUTO: 71.6 %
NRBC # BLD AUTO: 0 10*3/UL
NRBC BLD AUTO-RTO: 0 /100
PLATELET # BLD AUTO: 235 10E9/L (ref 150–450)
POTASSIUM SERPL-SCNC: 3.8 MMOL/L (ref 3.4–5.3)
PROT SERPL-MCNC: 7.7 G/DL (ref 6.8–8.8)
RBC # BLD AUTO: 5.02 10E12/L (ref 4.4–5.9)
SODIUM SERPL-SCNC: 142 MMOL/L (ref 133–144)
TROPONIN I SERPL-MCNC: <0.015 UG/L (ref 0–0.04)
WBC # BLD AUTO: 8.5 10E9/L (ref 4–11)

## 2021-01-04 PROCEDURE — 93010 ELECTROCARDIOGRAM REPORT: CPT | Performed by: EMERGENCY MEDICINE

## 2021-01-04 PROCEDURE — 93005 ELECTROCARDIOGRAM TRACING: CPT | Performed by: EMERGENCY MEDICINE

## 2021-01-04 PROCEDURE — 99285 EMERGENCY DEPT VISIT HI MDM: CPT | Mod: 25 | Performed by: EMERGENCY MEDICINE

## 2021-01-04 PROCEDURE — 96374 THER/PROPH/DIAG INJ IV PUSH: CPT | Performed by: EMERGENCY MEDICINE

## 2021-01-04 PROCEDURE — 83690 ASSAY OF LIPASE: CPT | Performed by: EMERGENCY MEDICINE

## 2021-01-04 PROCEDURE — 71046 X-RAY EXAM CHEST 2 VIEWS: CPT

## 2021-01-04 PROCEDURE — 71046 X-RAY EXAM CHEST 2 VIEWS: CPT | Mod: 26 | Performed by: RADIOLOGY

## 2021-01-04 PROCEDURE — 80053 COMPREHEN METABOLIC PANEL: CPT | Performed by: EMERGENCY MEDICINE

## 2021-01-04 PROCEDURE — 84484 ASSAY OF TROPONIN QUANT: CPT | Performed by: EMERGENCY MEDICINE

## 2021-01-04 PROCEDURE — 85025 COMPLETE CBC W/AUTO DIFF WBC: CPT | Performed by: EMERGENCY MEDICINE

## 2021-01-04 PROCEDURE — 250N000011 HC RX IP 250 OP 636: Performed by: EMERGENCY MEDICINE

## 2021-01-04 RX ORDER — KETOROLAC TROMETHAMINE 15 MG/ML
15 INJECTION, SOLUTION INTRAMUSCULAR; INTRAVENOUS ONCE
Status: COMPLETED | OUTPATIENT
Start: 2021-01-04 | End: 2021-01-04

## 2021-01-04 RX ADMIN — KETOROLAC TROMETHAMINE 15 MG: 15 INJECTION, SOLUTION INTRAMUSCULAR; INTRAVENOUS at 05:15

## 2021-01-04 NOTE — ED PROVIDER NOTES
ED Provider Note  [facility]      History     Chief Complaint   Patient presents with     Rib Pain     HPI  Alex Gillespie is a 45 year old male who has no significant past medical history who presents emergency department from home with a complaint of chest pain.  Patient complains of right-sided chest pain that that started this evening around 2330.  Patient describes the pain as a constant sharp pain in the right side of his chest over his ribs that seems to be worse with taking deep breaths and worse with sitting up and movement.  Patient denies any trauma or injuries however he does report he did shovel outside today so was not sure if he pulled a muscle.  Patient took Tylenol around 3 AM with no improvement of the symptoms.  Patient denies any fever, chills, cough, shortness of breath, abdominal pain, nausea, vomiting, diarrhea, lower extremity pain or swelling.  Patient denies any history of cancer, history of blood clots, history of cardiac disease.  Patient denies any tobacco use, substance abuse, reports occasional alcohol use.    Past Medical History  No past medical history on file.  Past Surgical History:   Procedure Laterality Date     HC TOOTH EXTRACTION W/FORCEP      WISDOM TEETH          Acetaminophen (TYLENOL PO)       Ibuprofen (IBU PO)      No Known Allergies  Family History  Family History   Problem Relation Age of Onset     Cardiovascular Mother      Eye Disorder Father         catarct     Thyroid Disease Sister      Social History   Social History     Tobacco Use     Smoking status: Never Smoker     Smokeless tobacco: Never Used   Substance Use Topics     Alcohol use: Yes     Comment: occasional      Drug use: No      Past medical history, past surgical history, medications, allergies, family history, and social history were reviewed with the patient. No additional pertinent items.       Review of Systems  A complete review of systems was performed with  "pertinent positives and negatives noted in the HPI, and all other systems negative.    Physical Exam   BP: (!) 142/103  Pulse: 92  Temp: 98  F (36.7  C)  Resp: 16  Height: 185.4 cm (6' 1\")  SpO2: 98 %  Physical Exam  General: Afebrile, no acute distress   HEENT: Normocephalic, atraumatic, conjunctivae normal. MMM  Neck: non-tender, supple  Cardio: regular rate. regular rhythm   Resp: Normal work of breathing, no respiratory distress, lungs clear bilaterally, no wheezing, rhonchi, rales  Chest/Back:  Positive reproducible point tenderness over his right lateral chest wall/ribs no visual signs of trauma, no CVA tenderness   Abdomen: soft, non distension, no tenderness, no peritoneal signs   Neuro: alert and fully oriented. CN II-XII grossly intact. Grossly normal strength and sensation in all extremities.   MSK: no deformities. Normal range of motion  Integumentary/Skin: no rash visualized, normal color  Psych: normal affect, normal behavior    ED Course     ED Course as of Jan 04 0707   Mon Jan 04, 2021   0705 XR Chest 2 Views     Procedures             EKG Interpretation:      Interpreted by Kelly Cobb MD  Time reviewed: 0522  Symptoms at time of EKG: chest pain   Rhythm: normal sinus   Rate: normal  Axis: normal  Ectopy: none  Conduction: normal  ST Segments/ T Waves: No ST-T wave changes, no acute ischemic change  Q Waves: none  Comparison to prior: No old EKG available    Clinical Impression: normal EKG, no acute ischemic change          Results for orders placed or performed during the hospital encounter of 01/04/21   XR Chest 2 Views     Status: None    Narrative    EXAM: XR CHEST 2 VW  LOCATION: [facility]  DATE/TIME: 1/4/2021 6:13 AM    INDICATION: Chest pain.  COMPARISON: None.      Impression    IMPRESSION: Normal heart size and pulmonary vascularity. Lungs are clear. Calcified mediastinal nodes compatible with prior granulomatous disease. No significant bony abnormalities. Chest is " otherwise negative. No acute findings.             CBC with platelets differential     Status: None   Result Value Ref Range    WBC 8.5 4.0 - 11.0 10e9/L    RBC Count 5.02 4.4 - 5.9 10e12/L    Hemoglobin 14.6 13.3 - 17.7 g/dL    Hematocrit 44.3 40.0 - 53.0 %    MCV 88 78 - 100 fl    MCH 29.1 26.5 - 33.0 pg    MCHC 33.0 31.5 - 36.5 g/dL    RDW 12.6 10.0 - 15.0 %    Platelet Count 235 150 - 450 10e9/L    Diff Method Automated Method     % Neutrophils 71.6 %    % Lymphocytes 18.3 %    % Monocytes 7.1 %    % Eosinophils 1.9 %    % Basophils 0.7 %    % Immature Granulocytes 0.4 %    Nucleated RBCs 0 0 /100    Absolute Neutrophil 6.1 1.6 - 8.3 10e9/L    Absolute Lymphocytes 1.6 0.8 - 5.3 10e9/L    Absolute Monocytes 0.6 0.0 - 1.3 10e9/L    Absolute Eosinophils 0.2 0.0 - 0.7 10e9/L    Absolute Basophils 0.1 0.0 - 0.2 10e9/L    Abs Immature Granulocytes 0.0 0 - 0.4 10e9/L    Absolute Nucleated RBC 0.0    Comprehensive metabolic panel     Status: Abnormal   Result Value Ref Range    Sodium 142 133 - 144 mmol/L    Potassium 3.8 3.4 - 5.3 mmol/L    Chloride 109 94 - 109 mmol/L    Carbon Dioxide 28 20 - 32 mmol/L    Anion Gap 5 3 - 14 mmol/L    Glucose 107 (H) 70 - 99 mg/dL    Urea Nitrogen 18 7 - 30 mg/dL    Creatinine 0.98 0.66 - 1.25 mg/dL    GFR Estimate >90 >60 mL/min/[1.73_m2]    GFR Estimate If Black >90 >60 mL/min/[1.73_m2]    Calcium 9.0 8.5 - 10.1 mg/dL    Bilirubin Total 0.8 0.2 - 1.3 mg/dL    Albumin 3.8 3.4 - 5.0 g/dL    Protein Total 7.7 6.8 - 8.8 g/dL    Alkaline Phosphatase 105 40 - 150 U/L    ALT 50 0 - 70 U/L    AST 30 0 - 45 U/L   Troponin I     Status: None   Result Value Ref Range    Troponin I ES <0.015 0.000 - 0.045 ug/L   Lipase     Status: None   Result Value Ref Range    Lipase 106 73 - 393 U/L   EKG 12-lead, tracing only     Status: None   Result Value Ref Range    Interpretation ECG Click View Image link to view waveform and result      Medications   ketorolac (TORADOL) injection 15 mg (15 mg  Intravenous Given 1/4/21 0515)        Assessments & Plan (with Medical Decision Making)   Alex Gillespie is a 45 year old male who has no significant past medical history who presents emergency department from home with a complaint of chest pain.   Upon arrival patient is well-appearing, afebrile, no distress.  Patient is not hypoxic, no tachycardia, PERC negative.  Patient here with reproducible chest wall/rib tenderness to palpation pleuritic in nature.  Differential diagnosis includes but is not limited to musculoskeletal versus costochondritis versus atypical chest pain versus less likely ACS versus pulmonary embolism versus pneumonia versus pneumothorax.      I reviewed EKG which demonstrates normal sinus rhythm with ventricular rate of 83 bpm no acute ischemic change.  I reviewed comprehensive labs which are unremarkable with no leukocytosis white blood cell count 8.5, hemoglobin 14.6, no acute metabolic or electrolyte abnormality, no transaminitis, negative troponin.  Pain is been ongoing for 6+ hours at this time no need for repeat troponin.  Unlikely cardiac given patient's history, normal EKG, negative troponin, no risk factors.   I reviewed CXR which demonstrates no acute cardiopulmonary process, no pneumothorax, pneumonia, effusion.  On re-evaluation patient reports improvement of his pain after Toradol.  I suspect musculoskeletal etiology/inflammatory.  At this time patient feels comfortable discharge home.  I encouraged him to follow-up closely with his primary care provider, Tylenol and ibuprofen as needed for discomfort.  .The History, physical exam, and laboratory findings were reviewed with the patient and is not consistent with coronary disease or evolving myocardial infarction.  Did discuss to return immediately to the emergency department he develops any worsening of his chest pain, difficulty breathing, weakness, new worsening symptoms.  Patient understands agrees with the plan.    I have  reviewed the nursing notes. I have reviewed the findings, diagnosis, plan and need for follow up with the patient.    New Prescriptions    No medications on file       Final diagnoses:   Chest pain, unspecified type       --  Kelly Cobb MD  [facility] EMERGENCY DEPARTMENT  1/4/2021     Kelly Cobb MD  01/04/21 0709

## 2021-01-04 NOTE — ED AVS SNAPSHOT
[facility] Emergency Department  [address] [address]  Phone: [phone]                                    Alex Gillespie   MRN: [id number]    Department: [facility] Emergency Department   Date of Visit: 1/4/2021           After Visit Summary Signature Page    I have received my discharge instructions, and my questions have been answered. I have discussed any challenges I see with this plan with the nurse or doctor.    ..........................................................................................................................................  Patient/Patient Representative Signature      ..........................................................................................................................................  Patient Representative Print Name and Relationship to Patient    ..................................................               ................................................  Date                                   Time    ..........................................................................................................................................  Reviewed by Signature/Title    ...................................................              ..............................................  Date                                               Time          22EPIC Rev 08/18

## 2021-01-04 NOTE — DISCHARGE INSTRUCTIONS
Thank you for your patience today.  Please follow-up with your regular doctor in the next 2-3 days for further evaluation and follow-up care.  Please call to schedule an appointment.  Please continue your own medications.  Please take Tylenol or ibuprofen every 4-6 hours as needed for discomfort.  Please return to the ER if you develop high fever, worsening pain, difficulty breathing, or any worsening of your current symptoms.  It was a pleasure taking care of you today.  We hope you feel better soon.

## 2021-01-04 NOTE — ED TRIAGE NOTES
"Pt arrives for right sided pain he scribes as \"sharp\" and worsens when he sits or takes a deep breath in.   "

## 2021-09-18 ENCOUNTER — HEALTH MAINTENANCE LETTER (OUTPATIENT)
Age: 46
End: 2021-09-18

## 2022-01-08 ENCOUNTER — HEALTH MAINTENANCE LETTER (OUTPATIENT)
Age: 47
End: 2022-01-08

## 2022-09-15 ENCOUNTER — OFFICE VISIT (OUTPATIENT)
Dept: PODIATRY | Facility: CLINIC | Age: 47
End: 2022-09-15
Payer: OTHER GOVERNMENT

## 2022-09-15 VITALS — OXYGEN SATURATION: 98 % | DIASTOLIC BLOOD PRESSURE: 77 MMHG | SYSTOLIC BLOOD PRESSURE: 144 MMHG | HEART RATE: 67 BPM

## 2022-09-15 DIAGNOSIS — M72.2 PLANTAR FASCIITIS: Primary | ICD-10-CM

## 2022-09-15 PROCEDURE — 99203 OFFICE O/P NEW LOW 30 MIN: CPT | Performed by: PODIATRIST

## 2022-09-15 NOTE — LETTER
9/15/2022         RE: Alex Gillespie  [address] [place] [facility] [address]        Dear Colleague,    Thank you for referring your patient, Alex Gillespie, to the [facility]. Please see a copy of my visit note below.    Subjective:    Patient is seen today as a new pt self referral with a 6 month(s) hx of right heel pain.  Points to the plantarmedial calcaneal tubercle.  Most painful upon rising in a.m. or after prolonged sitting.  Aggravated by activity and relieved by rest.  Has tried changing shoewear, OTC inserts, without much success.  Denies erythema, edema, ecchymosis, numbness, loss of strength.  Standing 100 % of time at work.  Patient was overseas in [employer] when this started.  He is on his feet 7 days a week.  He had orthotics made but still painful.  Has been on foot loss of the last couple days and feeling better.    ROS: See above     No Known Allergies    Current Outpatient Medications   Medication Sig Dispense Refill     Acetaminophen (TYLENOL PO) Take by mouth as needed for mild pain or fever       Ibuprofen (IBU PO) Take by mouth as needed         Patient Active Problem List   Diagnosis     CARDIOVASCULAR SCREENING; LDL GOAL LESS THAN 160       No past medical history on file.    Past Surgical History:   Procedure Laterality Date     HC TOOTH EXTRACTION W/FORCEP      WISDOM TEETH       Family History   Problem Relation Age of Onset     Cardiovascular Mother      Eye Disorder Father         catarct     Thyroid Disease Sister        Social History     Tobacco Use     Smoking status: Never Smoker     Smokeless tobacco: Never Used   Substance Use Topics     Alcohol use: Yes     Comment: occasional          Objective:    Vitals: BP (!) 144/77 (BP Location: Left arm, Patient Position: Sitting, Cuff Size: Adult Regular)   Pulse 67   SpO2 98%   BMI: There is no height or weight on file to calculate BMI.  Height: Data Unavailable    Constitutional/ general:  Pt is in no  apparent distress, appears well-nourished.  Cooperative with history and physical exam.     Psych:  The patient answered questions appropriately.  Normal affect.  Seems to have reasonable expectations, in terms of treatment.     Lungs:  Non labored breathing, non labored speech. No cough.  No audible wheezing. Even, quiet breathing.       Vascular:  Pedal pulses are palpable bilaterally for both the DP and PT arteries.  CFT < 3 sec.  No edema.  Pedal hair growth noted.     Neuro:  Alert and oriented x 3. Coordinated gait.  Light touch sensation is intact    Derm: Normal texture and turgor.  No erythema, ecchymosis, or cyanosis.  No open lesions.     Musculoskeletal:    Lower extremity muscle strength is normal.  Patient is ambulatory without an assistive device or brace.  No gross deformities.      Normal arch with weightbearing.   ROM is within normal limits.  Negative tinel's sign.  No side to side compression pain of the calcaneus.  Pain upon palpation to the right plantarmedial  of the calcaneus.  No erythema, edema, ecchymosis, or subcutaneous masses noted.  No pain on palpation or stressing any tendons.  Negative Tinel's sign      Radiographic Exam:  X-Ray Findings:  I personally reviewed the films.  Normal    Assessment:  Plantar Fasciitis right foot     Plan:  X-rays taken today.  Discussed etiology and treatment options with the patient.  The potential causes and nature of plantar fasciitis were discussed with the patient.  We reviewed the natural history/prognosis of the condition and risks if left untreated.  These include chronic pain, other sites of pain due to gait changes, and potential plantar fascial rupture.      We discussed possible causes of the condition as it relates to the patients specific situation.      Conservative treatment options were reviewed:  appropriate shoes, avoidance of barefoot walking, inserts/orthoses, stretching, ice, massage, immobilization and NSAIDs.     We also reviewed  the options of injection therapy and surgery.  However, it was made clear that surgery is only considered when conservative therapy fails.  The risks and benefits of injection therapy, and surgery were discussed.  Dispensed handout.       After thorough discussion and answering all questions, the patient elected to modifying activities, supportive shoes, ice, stretching, and not going barefoot.  Good house shoes at all times and I made recommendations.  Avoid activities that bother this and I discussed what will bother this.  We dispensed a heel cup.  Continue wearing orthotics and good tennis shoes only doing low impact activities.  Also discussed physical therapy and injection.  RTC in 4 weeks if not better otherwise prn.     Ricardo Espana DPM, FACFAS          Again, thank you for allowing me to participate in the care of your patient.        Sincerely,        Ricardo Espana DPM

## 2022-09-15 NOTE — PATIENT INSTRUCTIONS
We wish you continued good healing. If you have any questions or concerns, please do not hesitate to contact us at  [phone]    Rebtelt (secure e-mail communication and access to your chart) to send a message or to make an appointment.    Please remember to call and schedule a follow up appointment if one was recommended at your earliest convenience.     PODIATRY CLINIC HOURS  TELEPHONE NUMBER    Dr. Ricardo MAKIPGALEN [facility]        Clinics:  [place]al Crawford CMA   Tuesday 1PM-6PM  [place]  Wednesday 745AM-330PM  [place]  Thursday/Friday 745AM-230PM  [place]/[place] APPOINTMENTS  [phone]    [place] APPOINTMENTS  [phone]        If you need a medication refill, please contact us you may need lab work and/or a follow up visit prior to your refill (i.e. Antifungal medications).  If MRI needed please call Imaging at [phone] or [phone]  HOW DO I GET MY KNEE SCOOTER? Knee scooters can be picked up at ANY Medical Supply stores with your knee scooter Prescription.  OR  Bring your signed prescription to an [facility] Medical Equipment showroom.

## 2022-09-15 NOTE — PROGRESS NOTES
Subjective:    Patient is seen today as a new pt self referral with a 6 month(s) hx of right heel pain.  Points to the plantarmedial calcaneal tubercle.  Most painful upon rising in a.m. or after prolonged sitting.  Aggravated by activity and relieved by rest.  Has tried changing shoewear, OTC inserts, without much success.  Denies erythema, edema, ecchymosis, numbness, loss of strength.  Standing 100 % of time at work.  Patient was overseas in [employer] when this started.  He is on his feet 7 days a week.  He had orthotics made but still painful.  Has been on foot loss of the last couple days and feeling better.    ROS: See above     No Known Allergies    Current Outpatient Medications   Medication Sig Dispense Refill     Acetaminophen (TYLENOL PO) Take by mouth as needed for mild pain or fever       Ibuprofen (IBU PO) Take by mouth as needed         Patient Active Problem List   Diagnosis     CARDIOVASCULAR SCREENING; LDL GOAL LESS THAN 160       No past medical history on file.    Past Surgical History:   Procedure Laterality Date     HC TOOTH EXTRACTION W/FORCEP      WISDOM TEETH       Family History   Problem Relation Age of Onset     Cardiovascular Mother      Eye Disorder Father         catarct     Thyroid Disease Sister        Social History     Tobacco Use     Smoking status: Never Smoker     Smokeless tobacco: Never Used   Substance Use Topics     Alcohol use: Yes     Comment: occasional          Objective:    Vitals: BP (!) 144/77 (BP Location: Left arm, Patient Position: Sitting, Cuff Size: Adult Regular)   Pulse 67   SpO2 98%   BMI: There is no height or weight on file to calculate BMI.  Height: Data Unavailable    Constitutional/ general:  Pt is in no apparent distress, appears well-nourished.  Cooperative with history and physical exam.     Psych:  The patient answered questions appropriately.  Normal affect.  Seems to have reasonable expectations, in terms of treatment.     Lungs:  Non labored  breathing, non labored speech. No cough.  No audible wheezing. Even, quiet breathing.       Vascular:  Pedal pulses are palpable bilaterally for both the DP and PT arteries.  CFT < 3 sec.  No edema.  Pedal hair growth noted.     Neuro:  Alert and oriented x 3. Coordinated gait.  Light touch sensation is intact    Derm: Normal texture and turgor.  No erythema, ecchymosis, or cyanosis.  No open lesions.     Musculoskeletal:    Lower extremity muscle strength is normal.  Patient is ambulatory without an assistive device or brace.  No gross deformities.      Normal arch with weightbearing.   ROM is within normal limits.  Negative tinel's sign.  No side to side compression pain of the calcaneus.  Pain upon palpation to the right plantarmedial  of the calcaneus.  No erythema, edema, ecchymosis, or subcutaneous masses noted.  No pain on palpation or stressing any tendons.  Negative Tinel's sign      Radiographic Exam:  X-Ray Findings:  I personally reviewed the films.  Normal    Assessment:  Plantar Fasciitis right foot     Plan:  X-rays taken today.  Discussed etiology and treatment options with the patient.  The potential causes and nature of plantar fasciitis were discussed with the patient.  We reviewed the natural history/prognosis of the condition and risks if left untreated.  These include chronic pain, other sites of pain due to gait changes, and potential plantar fascial rupture.      We discussed possible causes of the condition as it relates to the patients specific situation.      Conservative treatment options were reviewed:  appropriate shoes, avoidance of barefoot walking, inserts/orthoses, stretching, ice, massage, immobilization and NSAIDs.     We also reviewed the options of injection therapy and surgery.  However, it was made clear that surgery is only considered when conservative therapy fails.  The risks and benefits of injection therapy, and surgery were discussed.  Dispensed handout.       After  thorough discussion and answering all questions, the patient elected to modifying activities, supportive shoes, ice, stretching, and not going barefoot.  Good house shoes at all times and I made recommendations.  Avoid activities that bother this and I discussed what will bother this.  We dispensed a heel cup.  Continue wearing orthotics and good tennis shoes only doing low impact activities.  Also discussed physical therapy and injection.  RTC in 4 weeks if not better otherwise prn.     Ricardo Espana DPM, FACFAS

## 2022-12-07 ENCOUNTER — OFFICE VISIT (OUTPATIENT)
Dept: FAMILY MEDICINE | Facility: CLINIC | Age: 47
End: 2022-12-07
Payer: OTHER GOVERNMENT

## 2022-12-07 VITALS
SYSTOLIC BLOOD PRESSURE: 121 MMHG | HEART RATE: 74 BPM | BODY MASS INDEX: 33.78 KG/M2 | DIASTOLIC BLOOD PRESSURE: 79 MMHG | TEMPERATURE: 98.2 F | OXYGEN SATURATION: 97 % | WEIGHT: 256 LBS

## 2022-12-07 DIAGNOSIS — Z11.59 NEED FOR HEPATITIS C SCREENING TEST: ICD-10-CM

## 2022-12-07 DIAGNOSIS — Z12.11 SCREEN FOR COLON CANCER: ICD-10-CM

## 2022-12-07 DIAGNOSIS — Z11.4 SCREENING FOR HIV (HUMAN IMMUNODEFICIENCY VIRUS): ICD-10-CM

## 2022-12-07 PROCEDURE — 99212 OFFICE O/P EST SF 10 MIN: CPT | Performed by: INTERNAL MEDICINE

## 2022-12-07 NOTE — LETTER
[facility]  [address] [address]  Phone: [phone]    December 7, 2022        Alex Gillespie  [address] [place] [address]          To whom it may concern:    RE: Alex MARY Jose Miguel    Patient may return to work 12/7/2022 with the following:  No working or lifting restrictions    Please contact me for questions or concerns.      Sincerely,        Ricardo Blackwell MD

## 2022-12-07 NOTE — PROGRESS NOTES
Assessment & Plan   Problem List Items Addressed This Visit    None  Visit Diagnoses     Screen for colon cancer        Screening for HIV (human immunodeficiency virus)        Need for hepatitis C screening test                 Ok to return to work without restrictions with good wound healing      MED REC REQUIRED  Post Medication Reconciliation Status:       Work on weight loss  Regular exercise    Return in about 6 months (around 6/7/2023) for follow up of condition, medication management.    Ricardo Blackwell MD  [facility] LESLIE Ibrahim is a 47 year old, presenting for the following health issues:  ER F/U (With surgery)  [employer] reserves.   Turned 41   Deployment   [employer]     ED/UC Followup:    Facility:  [facility]  Date of visit: 11/11/22  Reason for visit: Gall stones/Gall bladder surgery    Current Status: Pain is completely gone, still working on diet since surgery   Dr. Leyva.  Help   Ibuprofen, tylenol         Review of Systems   Constitutional, HEENT, cardiovascular, pulmonary, gi and gu systems are negative, except as otherwise noted.      Objective    /79 (BP Location: Right arm, Patient Position: Chair, Cuff Size: Adult Large)   Pulse 74   Temp 98.2  F (36.8  C)   Wt 116.1 kg (256 lb)   SpO2 97%   BMI 33.78 kg/m    Body mass index is 33.78 kg/m .  Physical Exam   GENERAL: healthy, alert and no distress  EYES: Eyes grossly normal to inspection, PERRL and conjunctivae and sclerae normal  HENT: ear canals and TM's normal, nose and mouth without ulcers or lesions  NECK: no adenopathy, no asymmetry, masses, or scars and thyroid normal to palpation  RESP: lungs clear to auscultation - no rales, rhonchi or wheezes  CV: regular rate and rhythm, normal S1 S2, no S3 or S4, no murmur, click or rub, no peripheral edema and peripheral pulses strong  ABDOMEN: laproscopy scars healed and repair of umbilical hernia     MS: no gross musculoskeletal  defects noted, no edema  SKIN: no suspicious lesions or rashes  NEURO: Normal strength and tone, mentation intact and speech normal  BACK: no CVA tenderness, no paralumbar tenderness  PSYCH: mentation appears normal, affect normal/bright    No results found for any visits on 12/07/22.

## 2023-04-09 ENCOUNTER — HEALTH MAINTENANCE LETTER (OUTPATIENT)
Age: 48
End: 2023-04-09

## 2024-06-16 ENCOUNTER — HEALTH MAINTENANCE LETTER (OUTPATIENT)
Age: 49
End: 2024-06-16

## 2025-06-21 ENCOUNTER — HEALTH MAINTENANCE LETTER (OUTPATIENT)
Age: 50
End: 2025-06-21